# Patient Record
Sex: MALE | Race: WHITE | Employment: FULL TIME | URBAN - NONMETROPOLITAN AREA
[De-identification: names, ages, dates, MRNs, and addresses within clinical notes are randomized per-mention and may not be internally consistent; named-entity substitution may affect disease eponyms.]

---

## 2020-11-19 ENCOUNTER — ALLIED HEALTH/NURSE VISIT (OUTPATIENT)
Dept: FAMILY MEDICINE | Facility: OTHER | Age: 35
End: 2020-11-19
Attending: FAMILY MEDICINE
Payer: COMMERCIAL

## 2020-11-19 DIAGNOSIS — R53.83 FATIGUE: ICD-10-CM

## 2020-11-19 DIAGNOSIS — J02.9 SORE THROAT: Primary | ICD-10-CM

## 2020-11-19 PROCEDURE — 99207 PR NO CHARGE NURSE ONLY: CPT

## 2020-11-19 PROCEDURE — U0003 INFECTIOUS AGENT DETECTION BY NUCLEIC ACID (DNA OR RNA); SEVERE ACUTE RESPIRATORY SYNDROME CORONAVIRUS 2 (SARS-COV-2) (CORONAVIRUS DISEASE [COVID-19]), AMPLIFIED PROBE TECHNIQUE, MAKING USE OF HIGH THROUGHPUT TECHNOLOGIES AS DESCRIBED BY CMS-2020-01-R: HCPCS | Mod: ZL | Performed by: FAMILY MEDICINE

## 2020-11-19 PROCEDURE — C9803 HOPD COVID-19 SPEC COLLECT: HCPCS

## 2020-11-20 LAB
SARS-COV-2 RNA SPEC QL NAA+PROBE: NOT DETECTED
SPECIMEN SOURCE: NORMAL

## 2020-12-27 ENCOUNTER — HEALTH MAINTENANCE LETTER (OUTPATIENT)
Age: 35
End: 2020-12-27

## 2021-02-03 ENCOUNTER — ALLIED HEALTH/NURSE VISIT (OUTPATIENT)
Dept: FAMILY MEDICINE | Facility: OTHER | Age: 36
End: 2021-02-03
Attending: FAMILY MEDICINE
Payer: COMMERCIAL

## 2021-02-03 DIAGNOSIS — R05.9 COUGH: Primary | ICD-10-CM

## 2021-02-03 PROCEDURE — U0003 INFECTIOUS AGENT DETECTION BY NUCLEIC ACID (DNA OR RNA); SEVERE ACUTE RESPIRATORY SYNDROME CORONAVIRUS 2 (SARS-COV-2) (CORONAVIRUS DISEASE [COVID-19]), AMPLIFIED PROBE TECHNIQUE, MAKING USE OF HIGH THROUGHPUT TECHNOLOGIES AS DESCRIBED BY CMS-2020-01-R: HCPCS | Mod: ZL | Performed by: FAMILY MEDICINE

## 2021-02-03 PROCEDURE — C9803 HOPD COVID-19 SPEC COLLECT: HCPCS

## 2021-02-03 PROCEDURE — U0005 INFEC AGEN DETEC AMPLI PROBE: HCPCS | Mod: ZL | Performed by: FAMILY MEDICINE

## 2021-02-04 LAB
LABORATORY COMMENT REPORT: NORMAL
SARS-COV-2 RNA RESP QL NAA+PROBE: NEGATIVE
SARS-COV-2 RNA RESP QL NAA+PROBE: NORMAL
SPECIMEN SOURCE: NORMAL
SPECIMEN SOURCE: NORMAL

## 2021-07-18 ENCOUNTER — OFFICE VISIT (OUTPATIENT)
Dept: FAMILY MEDICINE | Facility: OTHER | Age: 36
End: 2021-07-18
Attending: PHYSICIAN ASSISTANT
Payer: COMMERCIAL

## 2021-07-18 ENCOUNTER — HOSPITAL ENCOUNTER (OUTPATIENT)
Dept: GENERAL RADIOLOGY | Facility: OTHER | Age: 36
End: 2021-07-18
Attending: FAMILY MEDICINE
Payer: COMMERCIAL

## 2021-07-18 VITALS
TEMPERATURE: 98.2 F | BODY MASS INDEX: 32.95 KG/M2 | RESPIRATION RATE: 18 BRPM | HEART RATE: 106 BPM | OXYGEN SATURATION: 98 % | DIASTOLIC BLOOD PRESSURE: 94 MMHG | SYSTOLIC BLOOD PRESSURE: 140 MMHG | WEIGHT: 243.3 LBS | HEIGHT: 72 IN

## 2021-07-18 DIAGNOSIS — M25.571 ACUTE RIGHT ANKLE PAIN: ICD-10-CM

## 2021-07-18 PROCEDURE — 99213 OFFICE O/P EST LOW 20 MIN: CPT | Performed by: FAMILY MEDICINE

## 2021-07-18 PROCEDURE — 73610 X-RAY EXAM OF ANKLE: CPT | Mod: RT

## 2021-07-18 ASSESSMENT — PAIN SCALES - GENERAL: PAINLEVEL: MODERATE PAIN (4)

## 2021-07-18 ASSESSMENT — MIFFLIN-ST. JEOR: SCORE: 2086.6

## 2021-07-18 NOTE — PROGRESS NOTES
CHIEF COMPLAINT    Injury to right ankle 1 day ago.      HISTORY    Kieran was trying to catch a ball hit by his son and stood kind of awkwardly on right foot and felt a pop in his ankle.  He developed lateral swelling and tenderness.  Treated with some ice and elevation overnight.    Main swelling and tenderness continues to be laterally elbow swelling has gone down a bit.      REVIEW OF SYSTEMS    Unremarkable except as above.      EXAM  BP (!) 140/94   Pulse 106   Temp 98.2  F (36.8  C) (Tympanic)   Resp 18   Ht 1.829 m (6')   Wt 110.4 kg (243 lb 4.8 oz)   SpO2 98%   BMI 33.00 kg/m      Right ankle.  Moderate swelling laterally.  Tender over anterior talofibular ligament primarily.  Medial side of right ankle is not especially tender or swollen.    Right foot unremarkable.      X-ray:  No fracture detected.      (S93.401A) Grade 1 ankle sprain, right, initial encounter  (primary encounter diagnosis)  Comment:   Plan:   RICE discussed.  Ankle support, probably from his work boots.  NSAID.  Gradually increase activity.  Follow-up for worsening symptoms or failure to improve.      (M25.571) Acute right ankle pain  Comment:   Plan: XR Ankle Right G/E 3 Views

## 2021-07-18 NOTE — NURSING NOTE
Chief Complaint   Patient presents with     Foot Problems     Hurt ankle while playing baseball he went to jump and he heard some popping in the right ankle.     Initial There were no vitals taken for this visit. There is no height or weight on file to calculate BMI.     FOOD SECURITY SCREENING QUESTIONS  Hunger Vital Signs:  Within the past 12 months we worried whether our food would run out before we got money to buy more. Never  Within the past 12 months the food we bought just didn't last and we didn't have money to get more. Never      Medication Reconciliation: Complete      You Ayers LPN

## 2021-08-30 ENCOUNTER — HOSPITAL ENCOUNTER (OUTPATIENT)
Dept: GENERAL RADIOLOGY | Facility: OTHER | Age: 36
End: 2021-08-30
Attending: NURSE PRACTITIONER
Payer: COMMERCIAL

## 2021-08-30 ENCOUNTER — OFFICE VISIT (OUTPATIENT)
Dept: FAMILY MEDICINE | Facility: OTHER | Age: 36
End: 2021-08-30
Attending: NURSE PRACTITIONER
Payer: COMMERCIAL

## 2021-08-30 VITALS
BODY MASS INDEX: 32.8 KG/M2 | RESPIRATION RATE: 12 BRPM | WEIGHT: 242.2 LBS | HEART RATE: 80 BPM | DIASTOLIC BLOOD PRESSURE: 98 MMHG | HEIGHT: 72 IN | TEMPERATURE: 98 F | SYSTOLIC BLOOD PRESSURE: 148 MMHG

## 2021-08-30 DIAGNOSIS — S59.902A INJURY OF LEFT ELBOW, INITIAL ENCOUNTER: ICD-10-CM

## 2021-08-30 DIAGNOSIS — M25.522 PAIN AND SWELLING OF LEFT ELBOW: ICD-10-CM

## 2021-08-30 DIAGNOSIS — S52.125A CLOSED NONDISPLACED FRACTURE OF HEAD OF LEFT RADIUS, INITIAL ENCOUNTER: Primary | ICD-10-CM

## 2021-08-30 DIAGNOSIS — M25.422 PAIN AND SWELLING OF LEFT ELBOW: ICD-10-CM

## 2021-08-30 PROCEDURE — 73080 X-RAY EXAM OF ELBOW: CPT | Mod: LT

## 2021-08-30 PROCEDURE — 29125 APPL SHORT ARM SPLINT STATIC: CPT | Mod: LT | Performed by: NURSE PRACTITIONER

## 2021-08-30 PROCEDURE — 99213 OFFICE O/P EST LOW 20 MIN: CPT | Mod: 25 | Performed by: NURSE PRACTITIONER

## 2021-08-30 ASSESSMENT — MIFFLIN-ST. JEOR: SCORE: 2081.61

## 2021-08-30 ASSESSMENT — PAIN SCALES - GENERAL: PAINLEVEL: MILD PAIN (2)

## 2021-08-30 NOTE — PROGRESS NOTES
HPI:    Kieran Waters is a 35 year old male  who presents to [facility] today for left elbow injury.    He was climbing the ladder going into his pool and the ladder kicked out and he landed with all his weight onto his left elbow on concrete.  He had immediate pain and swelling.  He was unable to bend it at all yesterday.  Pain is increasing today.  Bruising is increasing today.  Swelling is staying the same.  No numbness or tingling in left upper extremity.  Right hand dominant.  No head injury.  No neck pain.  No back pain.  No other extremity pain or injury.  Iced all day yesterday.  Taking Ibuprofen 800 mg once daily.      No past medical history on file.  No past surgical history on file.  Social History     Tobacco Use     Smoking status: Never Smoker     Smokeless tobacco: Former User   Substance Use Topics     Alcohol use: Not Currently     No current outpatient medications on file.     No Known Allergies      Past medical history, past surgical history, current medications and allergies reviewed and accurate to the best of my knowledge.        ROS:  Refer to HPI    BP (!) 148/98 (BP Location: Right arm, Patient Position: Sitting, Cuff Size: Adult Large)   Pulse 80   Temp 98  F (36.7  C) (Tympanic)   Resp 12   Ht 1.829 m (6')   Wt 109.9 kg (242 lb 3.2 oz)   BMI 32.85 kg/m      EXAM:  General Appearance: Well appearing adult male, appropriate appearance for age. No acute distress  Cardiovascular: CMS intact to left upper extremity with brisk capillary refill and strong radial pulse  Musculoskeletal:  Equal movement of bilateral upper extremities with exception of left elbow.  Left elbow with extensive swelling and tenderness to palpation.  Left elbow with decreased extension due to swelling, pain and guarding.  Left elbow with flexion but slow and guarded due to pain and swelling.  Strong left hand grasp.  Equal movement of bilateral lower extremities.  Normal gait.    Dermatological: Skin intact  to left upper extremity.  Left elbow with diffuse bruising.  Psychological: normal affect, alert, oriented, and pleasant.       Xray:  Results for orders placed or performed in visit on 08/30/21   XR Elbow Left G/E 3 Views     Status: None    Narrative    PROCEDURE:  XR ELBOW LEFT G/E 3 VIEWS    HISTORY: Injury of left elbow, initial encounter; Pain and swelling of  left elbow; Pain and swelling of left elbow    COMPARISON:  None.    TECHNIQUE:  3 views of the left elbow were obtained.    FINDINGS:  There is a nondisplaced radial head fracture. The distal  humerus and proximal ulna are intact. Articular spaces are normal in  height.       Impression    IMPRESSION: Nondisplaced radial head fracture.      JONATHAN ALCAZAR MD         SYSTEM ID:  [id number]           ASSESSMENT/PLAN:    I have reviewed the nursing notes.  I have reviewed the findings, diagnosis, plan and need for follow up with the patient.      ICD-10-CM    1. Closed nondisplaced fracture of head of left radius, initial encounter  S52.125A Orthopedic  Referral     APPLY SHORT ARM SPLINT STATIC     ARM SLING L   2. Injury of left elbow, initial encounter  S59.902A XR Elbow Left G/E 3 Views   3. Pain and swelling of left elbow  M25.522 XR Elbow Left G/E 3 Views    M25.422        X-ray of left elbow completed and personally reviewed, radial head fracture noted on the oblique view, radiologist over read: Nondisplaced radial head fracture.    Patient is placed in a short arm well-padded sugar tong splint with elbow at 90 degrees.  CMS remains intact after placement of splint.  Patient reports improvement in pain after placement of splint.  Patient instructed to leave splint on at all times.  Keep splint clean and dry.    Patient placed in sling after placement of splint.  Patient instructed to wear sling at all times.      Patient instructed to elevate as much as possible to reduce pain and swelling.  Patient instructed to ice frequently to reduce  pain and swelling.    Patient instructed no lifting or using his left upper extremity.    May use over-the-counter Tylenol or ibuprofen PRN  Patient declined any need for narcotic pain medication at this time and declined offer for prescription.    Referral to orthopedics, patient request [facility].  Patient needs to be seen within the next 1 to 2 weeks in follow-up by orthopedics for evaluation and ongoing management.        I explained my diagnostic considerations and recommendations to the patient, who voiced understanding and agreement with the treatment plan. All questions were answered. We discussed potential side effects of any prescribed or recommended therapies, as well as expectations for response to treatments.

## 2021-08-30 NOTE — NURSING NOTE
Chief Complaint   Patient presents with     Arm Injury     fell yesterday AM     Patient fell and hit his left arm on a .  His arm is bruised and swollen. Hurts to move it.     Initial BP (!) 148/98 (BP Location: Right arm, Patient Position: Sitting, Cuff Size: Adult Large)   Pulse 80   Temp 98  F (36.7  C) (Tympanic)   Resp 12   Ht 1.829 m (6')   Wt 109.9 kg (242 lb 3.2 oz)   BMI 32.85 kg/m   Estimated body mass index is 32.85 kg/m  as calculated from the following:    Height as of this encounter: 1.829 m (6').    Weight as of this encounter: 109.9 kg (242 lb 3.2 oz).  Medication Reconciliation: Completed     Advanced Care Directive Reviewed    Tramaine Mendez LPN

## 2021-10-09 ENCOUNTER — HEALTH MAINTENANCE LETTER (OUTPATIENT)
Age: 36
End: 2021-10-09

## 2022-01-29 ENCOUNTER — HEALTH MAINTENANCE LETTER (OUTPATIENT)
Age: 37
End: 2022-01-29

## 2022-09-17 ENCOUNTER — HEALTH MAINTENANCE LETTER (OUTPATIENT)
Age: 37
End: 2022-09-17

## 2023-05-06 ENCOUNTER — HEALTH MAINTENANCE LETTER (OUTPATIENT)
Age: 38
End: 2023-05-06

## 2024-07-13 ENCOUNTER — HEALTH MAINTENANCE LETTER (OUTPATIENT)
Age: 39
End: 2024-07-13